# Patient Record
Sex: FEMALE | Race: WHITE
[De-identification: names, ages, dates, MRNs, and addresses within clinical notes are randomized per-mention and may not be internally consistent; named-entity substitution may affect disease eponyms.]

---

## 2017-07-10 ENCOUNTER — HOSPITAL ENCOUNTER (EMERGENCY)
Dept: HOSPITAL 42 - ED | Age: 37
Discharge: HOME | End: 2017-07-10
Payer: MEDICAID

## 2017-07-10 VITALS — OXYGEN SATURATION: 100 % | TEMPERATURE: 98.7 F

## 2017-07-10 VITALS — RESPIRATION RATE: 16 BRPM | HEART RATE: 82 BPM | DIASTOLIC BLOOD PRESSURE: 69 MMHG | SYSTOLIC BLOOD PRESSURE: 112 MMHG

## 2017-07-10 VITALS — BODY MASS INDEX: 30.9 KG/M2

## 2017-07-10 DIAGNOSIS — S90.812A: Primary | ICD-10-CM
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
## 2017-07-10 DIAGNOSIS — Z23: ICD-10-CM

## 2017-07-10 NOTE — ED PDOC
Arrival/HPI





- General


Historian: Patient





- General


Time Seen by Provider: 07/10/17 17:44





- History of Present Illness


Narrative History of Present Illness (Text): 





07/10/17 17:49


36 y/o female, no significant pmh, nkda, last tetanus over 10 years ago, c/o 

lt. foot dog bite x 1 hour.  Pt. was accidentally bite by the neighbor's dog, 

dog has all the immunization up to date including rabies as per patient, no 

numbness or tingling, no difficulty moving the lt. foot, no fever or chills.  (

Jevon Ibrahim)





Past Medical History





- Provider Review


Nursing Documentation Reviewed: Yes





- Past History


Past History: No Previous





- Integumentary


Hx Dermatological Disorder: Yes


Other/Comment: right axillary mass





- Past Surgical History


Past Surgical History: No Previous





- Anesthesia


Hx Anesthesia: No


Hx Anesthesia Reactions: No


Hx Malignant Hyperthermia: No





Family/Social History





- Physician Review


Nursing Documentation Reviewed: Yes


Family/Social History: Unknown Family HX


Smoking Status: Never Smoked


Hx Alcohol Use: No


Hx Substance Use Treatment: No





Allergies/Home Meds


Allergies/Adverse Reactions: 


Allergies





No Known Allergies Allergy (Verified 07/10/17 17:57)


 











Review of Systems





- Review of Systems


Constitutional: absent: Fatigue, Fevers


Eyes: absent: Vision Changes


ENT: absent: Hearing Changes


Respiratory: absent: SOB, Cough


Cardiovascular: absent: Chest Pain


Gastrointestinal: absent: Abdominal Pain, Diarrhea, Nausea, Vomiting


Skin: absent: Rash, Pruritis, Skin Lesions


Neurological: absent: Headache, Dizziness





Physical Exam


Vital Signs Reviewed: Yes


Temperature: Afebrile


Blood Pressure: Normal


Pulse: Regular


Respiratory Rate: Normal


Appearance: Positive for: Well-Appearing, Non-Toxic, Comfortable


Pain Distress: Mild


Mental Status: Positive for: Alert and Oriented X 3





- Systems Exam


Head: Present: Atraumatic, Normocephalic


Pupils: Present: PERRL


Extroacular Muscles: Present: EOMI


Conjunctiva: Present: Normal


Mouth: Present: Moist Mucous Membranes


Neck: Present: Normal Range of Motion


Respiratory/Chest: Present: Clear to Auscultation, Good Air Exchange.  No: 

Respiratory Distress, Accessory Muscle Use


Cardiovascular: Present: Regular Rate and Rhythm, Normal S1, S2.  No: Murmurs


Abdomen: Present: Normal Bowel Sounds.  No: Tenderness, Distention, Peritoneal 

Signs


Back: Present: Normal Inspection


Upper Extremity: Present: Normal Inspection.  No: Cyanosis, Edema


Lower Extremity: Present: Normal Inspection.  No: Edema


Neurological: Present: GCS=15, Speech Normal, Motor Func Grossly Intact, Gait 

Normal, Memory Normal


Skin: Present: Warm, Dry, Rashes (lt. foot: visible superficial abrasion 

approx. 0.1cm diameter on each bite wound with no puncture or laceration gap 

wound, no bony tenderness or deformity, FROM without limitation, sensation 

intact, motor 5/5, +DPPT pulses, capillary refill< 2 seconds, neurovascular 

intact. ), Normal Color


Psychiatric: Present: Alert, Oriented x 3, Normal Insight, Normal Concentration





Medical Decision Making


ED Course and Treatment: 





07/10/17 17:59


-tdap


-irrigated with saline and clean with betadine, gauze dressing.


-Discharge home with augmentin, bacitracin ointment, clean with soap and water 

twice daily, follow up with your own pmd and podiatrist within 2 days, return 

to the ER for any new or worsening signs or symptoms. 


 (Jevon Ibrahim)


I was available for consultation during PA evaluation. The chart was reviewed 

by me, and I agree with disposition. The documented history was done by the 

physician extender. The documented physical exam was done by the physician 

extender. The documented procedures were done by the physician extender.


 (Ricky Jose)





- Medication Orders


Current Medication Orders: 














Discontinued Medications





Tetanus/Reduced Diphtheria/Acell Pertussis (Boostrix Vaccine Inj)  0.5 ml IM 

.ONCE ONE


   Stop: 07/10/17 18:00











- PA / NP / Resident Statement


MD/DO has reviewed & agrees with the documentation as recorded.





Disposition/Present on Arrival





- Present on Arrival


Any Indicators Present on Arrival: No


History of DVT/PE: No


History of Uncontrolled Diabetes: No


Urinary Catheter: No


History of Decub. Ulcer: No





- Disposition


Have Diagnosis and Disposition been Completed?: Yes


Disposition Time: 18:22


Patient Plan: Discharge





- Disposition


Diagnosis: 


 Dog bite, Foot abrasion





Disposition: HOME/ ROUTINE


Additional Instructions: 


-Discharge home with augmentin, bacitracin ointment, clean with soap and water 

twice daily, follow up with your own pmd and podiatrist within 2 days, return 

to the ER for any new or worsening signs or symptoms. 


Prescriptions: 


Amoxicillin/Clavulanate [Augmentin 875 MG-125 MG] 1 tab PO BID #14 tab


Bacitracin Ointment [Bacitracin] 1 appful TOP BID #15 g


Referrals: 


Gerry Gruber DPM [Staff Provider] - Follow up with primary


[facility] [Outside] - Follow up with primary


Forms:  WORK NOTE

## 2018-02-15 ENCOUNTER — HOSPITAL ENCOUNTER (OUTPATIENT)
Dept: HOSPITAL 42 - ENDO | Age: 38
Discharge: HOME | End: 2018-02-15
Payer: MEDICAID

## 2018-02-15 VITALS — BODY MASS INDEX: 30.9 KG/M2

## 2018-02-15 VITALS
RESPIRATION RATE: 16 BRPM | SYSTOLIC BLOOD PRESSURE: 118 MMHG | DIASTOLIC BLOOD PRESSURE: 74 MMHG | HEART RATE: 64 BPM | OXYGEN SATURATION: 100 %

## 2018-02-15 VITALS — TEMPERATURE: 98 F

## 2018-02-15 DIAGNOSIS — R10.13: ICD-10-CM

## 2018-02-15 DIAGNOSIS — B96.81: ICD-10-CM

## 2018-02-15 DIAGNOSIS — K29.50: Primary | ICD-10-CM

## 2018-02-15 PROCEDURE — 84703 CHORIONIC GONADOTROPIN ASSAY: CPT

## 2018-02-15 PROCEDURE — 88305 TISSUE EXAM BY PATHOLOGIST: CPT

## 2018-02-15 PROCEDURE — 43239 EGD BIOPSY SINGLE/MULTIPLE: CPT

## 2018-02-15 PROCEDURE — 88342 IMHCHEM/IMCYTCHM 1ST ANTB: CPT

## 2018-10-05 ENCOUNTER — HOSPITAL ENCOUNTER (EMERGENCY)
Dept: HOSPITAL 42 - ED | Age: 38
LOS: 1 days | Discharge: HOME | End: 2018-10-06
Payer: MEDICAID

## 2018-10-05 VITALS — RESPIRATION RATE: 18 BRPM | TEMPERATURE: 98.2 F

## 2018-10-05 VITALS — BODY MASS INDEX: 29.9 KG/M2
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
## 2018-10-05 DIAGNOSIS — T14.8XXA: Primary | ICD-10-CM
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
## 2018-10-06 VITALS — OXYGEN SATURATION: 100 % | DIASTOLIC BLOOD PRESSURE: 65 MMHG | HEART RATE: 79 BPM | SYSTOLIC BLOOD PRESSURE: 120 MMHG
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
